# Patient Record
Sex: FEMALE | Race: BLACK OR AFRICAN AMERICAN | NOT HISPANIC OR LATINO | Employment: OTHER | URBAN - METROPOLITAN AREA
[De-identification: names, ages, dates, MRNs, and addresses within clinical notes are randomized per-mention and may not be internally consistent; named-entity substitution may affect disease eponyms.]

---

## 2023-09-05 ENCOUNTER — HOSPITAL ENCOUNTER (EMERGENCY)
Facility: CLINIC | Age: 43
Discharge: HOME | End: 2023-09-06
Attending: EMERGENCY MEDICINE | Admitting: EMERGENCY MEDICINE

## 2023-09-05 DIAGNOSIS — B34.9 VIRAL ILLNESS: ICD-10-CM

## 2023-09-05 PROCEDURE — 99283 EMERGENCY DEPT VISIT LOW MDM: CPT

## 2023-09-06 VITALS
SYSTOLIC BLOOD PRESSURE: 169 MMHG | HEART RATE: 78 BPM | OXYGEN SATURATION: 100 % | RESPIRATION RATE: 18 BRPM | WEIGHT: 210 LBS | DIASTOLIC BLOOD PRESSURE: 99 MMHG | TEMPERATURE: 98.4 F

## 2023-09-06 LAB
FLUAV RNA SPEC QL NAA+PROBE: NEGATIVE
FLUBV RNA RESP QL NAA+PROBE: NEGATIVE
RSV RNA SPEC NAA+PROBE: NEGATIVE
SARS-COV-2 RNA RESP QL NAA+PROBE: NEGATIVE

## 2023-09-06 PROCEDURE — 87637 SARSCOV2&INF A&B&RSV AMP PRB: CPT | Performed by: EMERGENCY MEDICINE

## 2023-09-06 NOTE — ED PROVIDER NOTES
EMERGENCY DEPARTMENT ENCOUnter      NAME: Fifi Jaimes  AGE: 43 year old female  YOB: 1980  MRN: [id number]  EVALUATION DATE & TIME: No admission date for patient encounter.    PCP: [facility]    ED PROVIDER: Letha Maldonado MD      Chief Complaint   Patient presents with    Shortness of Breath    Cough    Fever         FINAL IMPRESSION:  1. Viral illness          ED COURSE & MEDICAL DECISION MAKING:        In summary, the patient is a 43-year-old female that presents to the emergency department for evaluation of cough fever and shortness of breath thought secondary to a viral illness.  She has no evidence of COVID, influenza, or other more serious etiologies of her symptoms.  We will treat symptomatically as an outpatient with close outpatient follow-up.  010Lory JOSE met with the patient, obtained history, performed an initial exam, and discussed options and plan for diagnostics and treatment here in the ED. We discussed the plan for discharge and the patient is agreeable. Reviewed supportive cares, symptomatic treatment, outpatient follow up, and reasons to return to the Emergency Department. Patient to be discharged by ED RN.      Medical Decision Making    History:  Supplemental history from: Documented in chart, if applicable and Family Member/Significant Other  External Record(s) reviewed: Documented in chart, if applicable.    Work Up:  Chart documentation includes differential considered and any EKGs or imaging independently interpreted by provider, where specified.  In additional to work up documented, I considered the following work up: Documented in chart, if applicable.    External consultation:  Discussion of management with another provider: Documented in chart, if applicable    Complicating factors:  Care impacted by chronic illness: N/A  Care affected by social determinants of health: N/A    Disposition considerations: Discharge. No recommendations on  "prescription strength medication(s). See documentation for any additional details.        At the conclusion of the encounter I discussed the results of all of the tests and the disposition. The questions were answered. The patient or family acknowledged understanding and was agreeable with the care plan.         MEDICATIONS GIVEN IN THE EMERGENCY:  Medications - No data to display    NEW PRESCRIPTIONS STARTED AT TODAY'S ER VISIT  There are no discharge medications for this patient.         =================================================================    HPI    Fifi Jaimes is a 43 year old female with a pertinent history of developmental delay who presents to this ED via walk-in for evaluation of shortness of breath, fever, and cough.    Per mother, patient started feeling unwell about four days ago. At that time, she developed some \"orange\" diarrhea. Over the last several days, she has developed fevers, shortness of breath, body aches, and cough. She last had Tylenol at 1200. No known exposures but mom reports that they did go out grocery shopping a couple days prior to onset of symptoms. Patient has not taken a COVID test at home. Mother is also here for evaluation with similar symptoms. Patient has no significant medical history per mother. She does not take any medications regularly. No allergies to medications. Patient does not smoke or drink alcohol. Patient lives with her mother.    REVIEW OF SYSTEMS     Constitutional:  Denies  chills. Positive for fever  HENT:  Denies sore throat   Respiratory:  Positive for cough and shortness of breath   Cardiovascular:  Denies chest pain or palpitations  GI:  Denies abdominal pain, nausea, or vomiting. Positive for diarrhea  Musculoskeletal:  Positive for myalgias  Skin:  Denies rash   Neurologic:  Denies headache, focal weakness or sensory changes    All other systems reviewed and are negative      PAST MEDICAL HISTORY:  History reviewed. No pertinent past " medical history.    PAST SURGICAL HISTORY:  History reviewed. No pertinent surgical history.        CURRENT MEDICATIONS:    No current outpatient medications on file.      ALLERGIES:  No Known Allergies    FAMILY HISTORY:  History reviewed. No pertinent family history.    SOCIAL HISTORY:   Social History     Socioeconomic History    Marital status: Single     Spouse name: None    Number of children: None    Years of education: None    Highest education level: None           PHYSICAL EXAM    Constitutional:  Well developed, Well nourished,  HENT:  Normocephalic, Atraumatic, Bilateral external ears normal, Oropharynx moist, Nose normal.   Neck:  Normal range of motion, No meningismus, No stridor.   Eyes:  EOMI, Conjunctiva normal, No discharge.   Respiratory:  Normal breath sounds, No respiratory distress, No wheezing, No chest tenderness.   Cardiovascular:  Normal heart rate, Normal rhythm, No murmurs  GI:  Soft, No tenderness, No guarding,   Musculoskeletal:  Neurovascularly intact distally, No edema, No tenderness, No cyanosis, Good range of motion in all major joints. No tenderness to palpation or major deformities noted.   Integument:  Warm, Dry, No erythema, No rash.   Lymphatic:  No lymphadenopathy noted.   Neurologic:  Alert & oriented x 3, Normal motor function, Normal sensory function, No focal deficits noted.   Psychiatric:  Affect normal, Judgment normal, Mood normal.      LAB:  All pertinent labs reviewed and interpreted.  Results for orders placed or performed during the hospital encounter of 09/05/23   Symptomatic Influenza A/B, RSV, & SARS-CoV2 PCR (COVID-19) Nasopharyngeal    Specimen: Nasopharyngeal; Swab   Result Value Ref Range    Influenza A PCR Negative Negative    Influenza B PCR Negative Negative    RSV PCR Negative Negative    SARS CoV2 PCR Negative Negative               Elidia JOSE, am serving as a scribe to document services personally performed by Dr. Madlonado based on my observation  and the provider's statements to me. I, Letha Maldonado MD attest that Elidia Luisa is acting in a scribe capacity, has observed my performance of the services and has documented them in accordance with my direction.    Letha Maldonado MD  Emergency Medicine  [facility] EMERGENCY ROOM  [address] [id number] [phone]  Dept: [phone]     Letha Maldonado MD  09/30/23 [id number]

## 2023-09-06 NOTE — ED TRIAGE NOTES
Pt here with cough, fever, sob. Cough productive of green phlegm. Last took tylenol given at 1200 for fever. Lives with mom who is here to be seen also.

## 2023-09-06 NOTE — ED NOTES
Pt reports feeling okay with discharge, she wants to go home. Does not want to be here. Denies CP or SOB, just a cough.

## 2023-09-06 NOTE — DISCHARGE INSTRUCTIONS
I will call you if your COVID test is positive.  If it is not positive I will not contact you.  You can also review your results on MyChart  Tylenol 650 mg every 4 hours as needed for fever or pain  Follow-up with your primary care provider within the next 2 to 3 days for recheck